# Patient Record
Sex: MALE | Race: OTHER | Employment: OTHER | URBAN - METROPOLITAN AREA
[De-identification: names, ages, dates, MRNs, and addresses within clinical notes are randomized per-mention and may not be internally consistent; named-entity substitution may affect disease eponyms.]

---

## 2017-02-21 ENCOUNTER — PREPPED CHART (OUTPATIENT)
Dept: URBAN - METROPOLITAN AREA CLINIC 35 | Facility: CLINIC | Age: 37
End: 2017-02-21

## 2017-12-18 NOTE — PATIENT DISCUSSION
(H25.013) Cortical age-related cataract, bilateral - Assesment : Examination revealed Cortical Senile Cataract OU. Patient is currently asymptomatic and functioning well. - Plan : Monitor for changes. Advised patient to call our office with decreased vision or increased symptoms.

## 2017-12-18 NOTE — PATIENT DISCUSSION
(D50.992) Epiphora due to insufficient drainage, bi lacrimal glands - Assesment : Examination revealed epiphora OU. - Plan : Continue artificial tears PRN OU. RTC 1 year exam or sooner if symptoms worsen or changes in vision.

## 2018-02-26 ASSESSMENT — VISUAL ACUITY
OD_CC: J1
OD_SC: 20/200
OS_CC: 20/30
OD_CC: 20/20
OS_CC: J1
OS_SC: CF 6FT

## 2018-02-26 ASSESSMENT — TONOMETRY
OD_IOP_MMHG: 13
OS_IOP_MMHG: 12

## 2018-02-27 ENCOUNTER — ESTABLISHED COMPREHENSIVE EXAM (OUTPATIENT)
Dept: URBAN - METROPOLITAN AREA CLINIC 35 | Facility: CLINIC | Age: 38
End: 2018-02-27

## 2018-02-27 DIAGNOSIS — H52.31: ICD-10-CM

## 2018-02-27 PROCEDURE — 92310-1 LEVEL 1 CONTACT LENS MANAGEMENT

## 2018-02-27 PROCEDURE — 92015 DETERMINE REFRACTIVE STATE: CPT

## 2018-02-27 PROCEDURE — 92014 COMPRE OPH EXAM EST PT 1/>: CPT

## 2018-02-27 ASSESSMENT — TONOMETRY
OS_IOP_MMHG: 15
OD_IOP_MMHG: 14

## 2018-02-27 ASSESSMENT — VISUAL ACUITY
OS_CC: 20/50+1
OD_SC: 20/400
OD_CC: 20/40
OS_SC: CF 6FT

## 2018-03-28 ENCOUNTER — LASIK CONSULTATION (OUTPATIENT)
Dept: URBAN - METROPOLITAN AREA CLINIC 39 | Facility: CLINIC | Age: 38
End: 2018-03-28

## 2018-03-28 VITALS — DIASTOLIC BLOOD PRESSURE: 90 MMHG | SYSTOLIC BLOOD PRESSURE: 126 MMHG | HEART RATE: 54 BPM | HEIGHT: 60 IN

## 2018-03-28 DIAGNOSIS — H52.13: ICD-10-CM

## 2018-03-28 DIAGNOSIS — H52.31: ICD-10-CM

## 2018-03-28 DIAGNOSIS — H52.203: ICD-10-CM

## 2018-03-28 PROCEDURE — 99499LK REFRACTIVE CONSULT/LASIK

## 2018-03-28 ASSESSMENT — VISUAL ACUITY
OS_SC: J1
OU_CC: 20/20
OU_CC: J1
OU_SC: J1
OD_CC: 20/30+2
OS_SC: CF 6FT
OD_SC: J1
OD_SC: 20/400
OD_CC: J1
OS_CC: J1
OS_CC: 20/25-2
OU_SC: 20/400

## 2018-03-28 ASSESSMENT — TONOMETRY
OD_IOP_MMHG: 14
OS_IOP_MMHG: 16

## 2018-04-02 ENCOUNTER — CONTACT LENS EXAM NEW (OUTPATIENT)
Dept: URBAN - METROPOLITAN AREA CLINIC 35 | Facility: CLINIC | Age: 38
End: 2018-04-02

## 2018-04-02 DIAGNOSIS — H52.13: ICD-10-CM

## 2018-04-02 DIAGNOSIS — H52.31: ICD-10-CM

## 2018-04-02 PROCEDURE — 92310L CL MGMNT PRE-LASIK TRIAL

## 2018-04-02 ASSESSMENT — VISUAL ACUITY
OD_CC: 20/20
OS_CC: 20/40

## 2020-10-14 ENCOUNTER — CONTACT LENS EXAM ESTABLISHED (OUTPATIENT)
Dept: URBAN - METROPOLITAN AREA CLINIC 35 | Facility: CLINIC | Age: 40
End: 2020-10-14

## 2020-10-14 DIAGNOSIS — H52.31: ICD-10-CM

## 2020-10-14 DIAGNOSIS — H52.13: ICD-10-CM

## 2020-10-14 DIAGNOSIS — H52.203: ICD-10-CM

## 2020-10-14 PROCEDURE — 92014 COMPRE OPH EXAM EST PT 1/>: CPT

## 2020-10-14 PROCEDURE — 92310-1 LEVEL 1 CONTACT LENS MANAGEMENT

## 2020-10-14 PROCEDURE — 92015 DETERMINE REFRACTIVE STATE: CPT

## 2020-10-14 ASSESSMENT — VISUAL ACUITY
OS_CC: 20/25-1
OD_CC: J3
OD_CC: 20/25-1
OD_SC: 20/400
OS_SC: CF 6FT
OS_CC: J3

## 2020-10-14 ASSESSMENT — KERATOMETRY
OD_K2POWER_DIOPTERS: 43
OS_AXISANGLE_DEGREES: 42
OD_K1POWER_DIOPTERS: 42.5
OS_K2POWER_DIOPTERS: 42.25
OS_K1POWER_DIOPTERS: 42.75
OD_AXISANGLE_DEGREES: 35
OS_AXISANGLE2_DEGREES: 132
OD_AXISANGLE2_DEGREES: 125

## 2020-10-14 ASSESSMENT — TONOMETRY
OD_IOP_MMHG: 16
OS_IOP_MMHG: 16

## 2022-03-18 ENCOUNTER — COMPREHENSIVE EXAM (OUTPATIENT)
Dept: URBAN - METROPOLITAN AREA CLINIC 35 | Facility: CLINIC | Age: 42
End: 2022-03-18

## 2022-03-18 DIAGNOSIS — H40.013: ICD-10-CM

## 2022-03-18 DIAGNOSIS — H25.813: ICD-10-CM

## 2022-03-18 DIAGNOSIS — H52.203: ICD-10-CM

## 2022-03-18 DIAGNOSIS — H52.31: ICD-10-CM

## 2022-03-18 DIAGNOSIS — H31.093: ICD-10-CM

## 2022-03-18 DIAGNOSIS — H52.13: ICD-10-CM

## 2022-03-18 PROCEDURE — 92015 DETERMINE REFRACTIVE STATE: CPT

## 2022-03-18 PROCEDURE — 92014 COMPRE OPH EXAM EST PT 1/>: CPT

## 2022-03-18 ASSESSMENT — KERATOMETRY
OS_AXISANGLE2_DEGREES: 132
OS_K2POWER_DIOPTERS: 42.25
OS_K1POWER_DIOPTERS: 42.75
OD_AXISANGLE_DEGREES: 35
OD_K1POWER_DIOPTERS: 42.5
OS_AXISANGLE_DEGREES: 42
OD_K2POWER_DIOPTERS: 43
OD_AXISANGLE2_DEGREES: 125

## 2022-03-18 ASSESSMENT — TONOMETRY
OS_IOP_MMHG: 18
OD_IOP_MMHG: 18

## 2022-03-18 ASSESSMENT — VISUAL ACUITY
OD_CC: 20/20-
OS_PH: 20/40-2
OS_SC: J2
OD_SC: J1
OS_CC: 20/40-2

## 2022-04-06 ENCOUNTER — FOLLOW UP (OUTPATIENT)
Dept: URBAN - METROPOLITAN AREA CLINIC 35 | Facility: CLINIC | Age: 42
End: 2022-04-06

## 2022-04-06 DIAGNOSIS — H25.813: ICD-10-CM

## 2022-04-06 DIAGNOSIS — H31.093: ICD-10-CM

## 2022-04-06 DIAGNOSIS — H40.013: ICD-10-CM

## 2022-04-06 DIAGNOSIS — H52.31: ICD-10-CM

## 2022-04-06 PROCEDURE — 92012 INTRM OPH EXAM EST PATIENT: CPT

## 2022-04-06 ASSESSMENT — KERATOMETRY
OS_K2POWER_DIOPTERS: 42.25
OS_AXISANGLE2_DEGREES: 132
OS_K1POWER_DIOPTERS: 42.75
OD_K2POWER_DIOPTERS: 43
OD_K1POWER_DIOPTERS: 42.5
OD_AXISANGLE_DEGREES: 35
OD_AXISANGLE2_DEGREES: 125
OS_AXISANGLE_DEGREES: 42

## 2022-04-06 ASSESSMENT — VISUAL ACUITY
OS_CC: 20/30-2
OD_CC: 20/20

## 2023-12-21 ENCOUNTER — COMPREHENSIVE EXAM (OUTPATIENT)
Dept: URBAN - METROPOLITAN AREA CLINIC 35 | Facility: CLINIC | Age: 43
End: 2023-12-21

## 2023-12-21 DIAGNOSIS — H52.13: ICD-10-CM

## 2023-12-21 DIAGNOSIS — H40.013: ICD-10-CM

## 2023-12-21 DIAGNOSIS — H31.093: ICD-10-CM

## 2023-12-21 DIAGNOSIS — J30.1: ICD-10-CM

## 2023-12-21 DIAGNOSIS — H52.31: ICD-10-CM

## 2023-12-21 DIAGNOSIS — H53.002: ICD-10-CM

## 2023-12-21 DIAGNOSIS — H25.813: ICD-10-CM

## 2023-12-21 PROCEDURE — 92014 COMPRE OPH EXAM EST PT 1/>: CPT

## 2023-12-21 PROCEDURE — 92015 DETERMINE REFRACTIVE STATE: CPT

## 2023-12-21 ASSESSMENT — VISUAL ACUITY
OU_CC: J1
OD_CC: J1
OU_SC: 20/100-1
OU_CC: 20/20
OS_PH: 20/20-2
OS_SC: CF 6FT
OD_SC: 20/200
OS_CC: 20/40
OS_CC: J3-
OD_CC: 20/20

## 2023-12-21 ASSESSMENT — TONOMETRY
OS_IOP_MMHG: 15
OD_IOP_MMHG: 13

## 2024-01-17 ENCOUNTER — TECH ONLY (OUTPATIENT)
Dept: URBAN - METROPOLITAN AREA CLINIC 35 | Facility: CLINIC | Age: 44
End: 2024-01-17

## 2024-01-17 DIAGNOSIS — H52.13: ICD-10-CM

## 2024-01-17 DIAGNOSIS — Z46.0: ICD-10-CM

## 2024-01-17 PROCEDURE — 92310F

## 2024-01-17 ASSESSMENT — VISUAL ACUITY
OU_CC: 20/20-1 CL
OS_CC: 20/20-1 CL
OD_CC: 20/20 CL

## 2025-01-21 ENCOUNTER — PREPPED CHART (OUTPATIENT)
Age: 45
End: 2025-01-21

## 2025-05-08 ENCOUNTER — COMPREHENSIVE EXAM (OUTPATIENT)
Age: 45
End: 2025-05-08

## 2025-05-08 DIAGNOSIS — H25.813: ICD-10-CM

## 2025-05-08 DIAGNOSIS — H53.002: ICD-10-CM

## 2025-05-08 DIAGNOSIS — H31.093: ICD-10-CM

## 2025-05-08 DIAGNOSIS — H40.013: ICD-10-CM

## 2025-05-08 DIAGNOSIS — H52.31: ICD-10-CM

## 2025-05-08 DIAGNOSIS — J30.1: ICD-10-CM

## 2025-05-08 DIAGNOSIS — H52.13: ICD-10-CM

## 2025-05-08 PROCEDURE — 92014 COMPRE OPH EXAM EST PT 1/>: CPT

## 2025-05-08 PROCEDURE — 92134 CPTRZ OPH DX IMG PST SGM RTA: CPT

## 2025-05-08 PROCEDURE — 92083 EXTENDED VISUAL FIELD XM: CPT

## 2025-05-08 PROCEDURE — 92015 DETERMINE REFRACTIVE STATE: CPT
